# Patient Record
Sex: MALE | URBAN - NONMETROPOLITAN AREA
[De-identification: names, ages, dates, MRNs, and addresses within clinical notes are randomized per-mention and may not be internally consistent; named-entity substitution may affect disease eponyms.]

---

## 2020-10-29 ENCOUNTER — APPOINTMENT (RX ONLY)
Dept: URBAN - NONMETROPOLITAN AREA CLINIC 4 | Facility: CLINIC | Age: 50
Setting detail: DERMATOLOGY
End: 2020-10-29

## 2020-10-29 DIAGNOSIS — L82.0 INFLAMED SEBORRHEIC KERATOSIS: ICD-10-CM

## 2020-10-29 PROBLEM — D48.5 NEOPLASM OF UNCERTAIN BEHAVIOR OF SKIN: Status: ACTIVE | Noted: 2020-10-29

## 2020-10-29 PROCEDURE — 17110 DESTRUCTION B9 LES UP TO 14: CPT | Mod: 59

## 2020-10-29 PROCEDURE — ? LIQUID NITROGEN

## 2020-10-29 PROCEDURE — ? COUNSELING

## 2020-10-29 PROCEDURE — ? SHAVE REMOVAL

## 2020-10-29 PROCEDURE — 11301 SHAVE SKIN LESION 0.6-1.0 CM: CPT

## 2020-10-29 ASSESSMENT — LOCATION ZONE DERM: LOCATION ZONE: FACE

## 2020-10-29 ASSESSMENT — LOCATION SIMPLE DESCRIPTION DERM: LOCATION SIMPLE: LEFT CHEEK

## 2020-10-29 ASSESSMENT — LOCATION DETAILED DESCRIPTION DERM: LOCATION DETAILED: LEFT SUPERIOR MEDIAL MALAR CHEEK

## 2020-10-29 NOTE — PROCEDURE: LIQUID NITROGEN
Include Z78.9 (Other Specified Conditions Influencing Health Status) As An Associated Diagnosis?: No
Post-Care Instructions: I reviewed with the patient in detail post-care instructions. Patient is to wear sunprotection, and avoid picking at any of the treated lesions. Pt may apply Vaseline to crusted or scabbing areas.
Number Of Freeze-Thaw Cycles: 1 freeze-thaw cycle
Medical Necessity Clause: This procedure was medically necessary because the lesions that were treated were:
Render Post-Care Instructions In Note?: yes
Medical Necessity Information: It is in your best interest to select a reason for this procedure from the list below. All of these items fulfill various CMS LCD requirements except the new and changing color options.
Consent: The patient's consent was obtained including but not limited to risks of crusting, scabbing, blistering, scarring, darker or lighter pigmentary change, recurrence, incomplete removal and infection.
Detail Level: Zone

## 2024-07-01 ENCOUNTER — OFFICE VISIT (OUTPATIENT)
Dept: URGENT CARE | Facility: CLINIC | Age: 54
End: 2024-07-01
Payer: COMMERCIAL

## 2024-07-01 ENCOUNTER — APPOINTMENT (OUTPATIENT)
Dept: RADIOLOGY | Facility: CLINIC | Age: 54
End: 2024-07-01
Payer: COMMERCIAL

## 2024-07-01 VITALS
DIASTOLIC BLOOD PRESSURE: 78 MMHG | TEMPERATURE: 97.6 F | RESPIRATION RATE: 18 BRPM | SYSTOLIC BLOOD PRESSURE: 124 MMHG | HEART RATE: 92 BPM | WEIGHT: 293 LBS | OXYGEN SATURATION: 95 %

## 2024-07-01 DIAGNOSIS — S20.211A CONTUSION OF RIB ON RIGHT SIDE, INITIAL ENCOUNTER: Primary | ICD-10-CM

## 2024-07-01 DIAGNOSIS — S20.211A CONTUSION OF RIB ON RIGHT SIDE, INITIAL ENCOUNTER: ICD-10-CM

## 2024-07-01 PROCEDURE — 71101 X-RAY EXAM UNILAT RIBS/CHEST: CPT

## 2024-07-01 PROCEDURE — 99213 OFFICE O/P EST LOW 20 MIN: CPT | Performed by: PHYSICIAN ASSISTANT

## 2024-07-01 PROCEDURE — G0382 LEV 3 HOSP TYPE B ED VISIT: HCPCS | Performed by: PHYSICIAN ASSISTANT

## 2024-07-01 PROCEDURE — S9083 URGENT CARE CENTER GLOBAL: HCPCS | Performed by: PHYSICIAN ASSISTANT

## 2024-07-01 RX ORDER — METFORMIN HYDROCHLORIDE 500 MG/1
1 TABLET, EXTENDED RELEASE ORAL
COMMUNITY
Start: 2024-05-15

## 2024-07-01 RX ORDER — OMEPRAZOLE 40 MG/1
40 CAPSULE, DELAYED RELEASE ORAL DAILY
COMMUNITY

## 2024-07-01 RX ORDER — PREDNISONE 10 MG/1
20 TABLET ORAL DAILY
Qty: 20 TABLET | Refills: 0 | Status: SHIPPED | OUTPATIENT
Start: 2024-07-01 | End: 2024-07-11

## 2024-07-01 RX ORDER — PAROXETINE HYDROCHLORIDE 40 MG/1
1 TABLET, FILM COATED ORAL DAILY
COMMUNITY
Start: 2024-05-02

## 2024-07-01 RX ORDER — SEMAGLUTIDE 1.34 MG/ML
1 INJECTION, SOLUTION SUBCUTANEOUS
COMMUNITY
Start: 2024-05-14

## 2024-07-01 NOTE — LETTER
July 1, 2024     Patient: Jeffrey Morrissey   YOB: 1970   Date of Visit: 7/1/2024       To Whom it May Concern:    Jeffrey Morrissey was seen in my clinic on 7/1/2024. He may return to work on 07/2/2024 .    If you have any questions or concerns, please don't hesitate to call.         Sincerely,          Bienvenido Yu PA-C        CC: No Recipients
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
## 2025-05-16 ENCOUNTER — HOSPITAL ENCOUNTER (EMERGENCY)
Facility: HOSPITAL | Age: 55
Discharge: HOME | End: 2025-05-16
Attending: EMERGENCY MEDICINE
Payer: COMMERCIAL

## 2025-05-16 ENCOUNTER — APPOINTMENT (EMERGENCY)
Dept: CT IMAGING | Facility: HOSPITAL | Age: 55
End: 2025-05-16
Payer: COMMERCIAL

## 2025-05-16 VITALS
DIASTOLIC BLOOD PRESSURE: 70 MMHG | OXYGEN SATURATION: 98 % | HEART RATE: 66 BPM | RESPIRATION RATE: 12 BRPM | WEIGHT: 300 LBS | TEMPERATURE: 97.4 F | SYSTOLIC BLOOD PRESSURE: 111 MMHG | HEIGHT: 73 IN | BODY MASS INDEX: 39.76 KG/M2

## 2025-05-16 DIAGNOSIS — R51.9 HEADACHE: Primary | ICD-10-CM

## 2025-05-16 DIAGNOSIS — R53.83 FATIGUE: ICD-10-CM

## 2025-05-16 LAB
ALBUMIN SERPL BCG-MCNC: 4.4 G/DL (ref 3.5–5)
ALP SERPL-CCNC: 52 U/L (ref 34–104)
ALT SERPL W P-5'-P-CCNC: 27 U/L (ref 7–52)
ANION GAP SERPL CALCULATED.3IONS-SCNC: 7 MMOL/L (ref 4–13)
AST SERPL W P-5'-P-CCNC: 18 U/L (ref 13–39)
BASOPHILS # BLD AUTO: 0.08 THOUSANDS/ÂΜL (ref 0–0.1)
BASOPHILS NFR BLD AUTO: 1 % (ref 0–1)
BILIRUB SERPL-MCNC: 0.44 MG/DL (ref 0.2–1)
BUN SERPL-MCNC: 12 MG/DL (ref 5–25)
CALCIUM SERPL-MCNC: 9.6 MG/DL (ref 8.4–10.2)
CHLORIDE SERPL-SCNC: 105 MMOL/L (ref 96–108)
CO2 SERPL-SCNC: 27 MMOL/L (ref 21–32)
CREAT SERPL-MCNC: 0.8 MG/DL (ref 0.6–1.3)
EOSINOPHIL # BLD AUTO: 0.22 THOUSAND/ÂΜL (ref 0–0.61)
EOSINOPHIL NFR BLD AUTO: 3 % (ref 0–6)
ERYTHROCYTE [DISTWIDTH] IN BLOOD BY AUTOMATED COUNT: 12.3 % (ref 11.6–15.1)
FLUAV AG UPPER RESP QL IA.RAPID: NEGATIVE
FLUBV AG UPPER RESP QL IA.RAPID: NEGATIVE
GFR SERPL CREATININE-BSD FRML MDRD: 101 ML/MIN/1.73SQ M
GLUCOSE SERPL-MCNC: 85 MG/DL (ref 65–140)
HCT VFR BLD AUTO: 46.5 % (ref 36.5–49.3)
HGB BLD-MCNC: 15.5 G/DL (ref 12–17)
IMM GRANULOCYTES # BLD AUTO: 0.04 THOUSAND/UL (ref 0–0.2)
IMM GRANULOCYTES NFR BLD AUTO: 1 % (ref 0–2)
LYMPHOCYTES # BLD AUTO: 1.78 THOUSANDS/ÂΜL (ref 0.6–4.47)
LYMPHOCYTES NFR BLD AUTO: 21 % (ref 14–44)
MCH RBC QN AUTO: 30.2 PG (ref 26.8–34.3)
MCHC RBC AUTO-ENTMCNC: 33.3 G/DL (ref 31.4–37.4)
MCV RBC AUTO: 91 FL (ref 82–98)
MONOCYTES # BLD AUTO: 0.77 THOUSAND/ÂΜL (ref 0.17–1.22)
MONOCYTES NFR BLD AUTO: 9 % (ref 4–12)
NEUTROPHILS # BLD AUTO: 5.62 THOUSANDS/ÂΜL (ref 1.85–7.62)
NEUTS SEG NFR BLD AUTO: 65 % (ref 43–75)
NRBC BLD AUTO-RTO: 0 /100 WBCS
PLATELET # BLD AUTO: 234 THOUSANDS/UL (ref 149–390)
PMV BLD AUTO: 10.2 FL (ref 8.9–12.7)
POTASSIUM SERPL-SCNC: 4 MMOL/L (ref 3.5–5.3)
PROT SERPL-MCNC: 7.6 G/DL (ref 6.4–8.4)
RBC # BLD AUTO: 5.13 MILLION/UL (ref 3.88–5.62)
SARS-COV+SARS-COV-2 AG RESP QL IA.RAPID: NEGATIVE
SODIUM SERPL-SCNC: 139 MMOL/L (ref 135–147)
TSH SERPL DL<=0.05 MIU/L-ACNC: 1.39 UIU/ML (ref 0.45–4.5)
WBC # BLD AUTO: 8.51 THOUSAND/UL (ref 4.31–10.16)

## 2025-05-16 PROCEDURE — 36415 COLL VENOUS BLD VENIPUNCTURE: CPT | Performed by: PHYSICIAN ASSISTANT

## 2025-05-16 PROCEDURE — 96375 TX/PRO/DX INJ NEW DRUG ADDON: CPT

## 2025-05-16 PROCEDURE — 85025 COMPLETE CBC W/AUTO DIFF WBC: CPT | Performed by: PHYSICIAN ASSISTANT

## 2025-05-16 PROCEDURE — 96361 HYDRATE IV INFUSION ADD-ON: CPT

## 2025-05-16 PROCEDURE — 70450 CT HEAD/BRAIN W/O DYE: CPT

## 2025-05-16 PROCEDURE — 99283 EMERGENCY DEPT VISIT LOW MDM: CPT

## 2025-05-16 PROCEDURE — 80053 COMPREHEN METABOLIC PANEL: CPT | Performed by: PHYSICIAN ASSISTANT

## 2025-05-16 PROCEDURE — 96374 THER/PROPH/DIAG INJ IV PUSH: CPT

## 2025-05-16 PROCEDURE — 87811 SARS-COV-2 COVID19 W/OPTIC: CPT | Performed by: PHYSICIAN ASSISTANT

## 2025-05-16 PROCEDURE — 84443 ASSAY THYROID STIM HORMONE: CPT | Performed by: PHYSICIAN ASSISTANT

## 2025-05-16 PROCEDURE — 87804 INFLUENZA ASSAY W/OPTIC: CPT | Performed by: PHYSICIAN ASSISTANT

## 2025-05-16 RX ORDER — METOCLOPRAMIDE HYDROCHLORIDE 5 MG/ML
10 INJECTION INTRAMUSCULAR; INTRAVENOUS ONCE
Status: COMPLETED | OUTPATIENT
Start: 2025-05-16 | End: 2025-05-16

## 2025-05-16 RX ORDER — DIPHENHYDRAMINE HYDROCHLORIDE 50 MG/ML
25 INJECTION, SOLUTION INTRAMUSCULAR; INTRAVENOUS ONCE
Status: COMPLETED | OUTPATIENT
Start: 2025-05-16 | End: 2025-05-16

## 2025-05-16 RX ORDER — KETOROLAC TROMETHAMINE 30 MG/ML
15 INJECTION, SOLUTION INTRAMUSCULAR; INTRAVENOUS ONCE
Status: COMPLETED | OUTPATIENT
Start: 2025-05-16 | End: 2025-05-16

## 2025-05-16 RX ADMIN — METOCLOPRAMIDE 10 MG: 5 INJECTION, SOLUTION INTRAMUSCULAR; INTRAVENOUS at 11:52

## 2025-05-16 RX ADMIN — KETOROLAC TROMETHAMINE 15 MG: 30 INJECTION, SOLUTION INTRAMUSCULAR; INTRAVENOUS at 11:52

## 2025-05-16 RX ADMIN — SODIUM CHLORIDE 1000 ML: 0.9 INJECTION, SOLUTION INTRAVENOUS at 11:51

## 2025-05-16 RX ADMIN — DIPHENHYDRAMINE HYDROCHLORIDE 25 MG: 50 INJECTION, SOLUTION INTRAMUSCULAR; INTRAVENOUS at 11:52

## 2025-05-16 NOTE — Clinical Note
Jeffrey Morrissey was seen and treated in our emergency department on 5/16/2025.                Diagnosis:     Jeffrey  may return to work on return date.    He may return on this date: 05/19/2025         If you have any questions or concerns, please don't hesitate to call.      Genesis Edmondson PA-C    ______________________________           _______________          _______________  Hospital Representative                              Date                                Time

## 2025-05-16 NOTE — ED PROVIDER NOTES
Time reflects when diagnosis was documented in both MDM as applicable and the Disposition within this note       Time User Action Codes Description Comment    5/16/2025  1:23 PM Genesis Edmondson Add [R51.9] Headache     5/16/2025  1:23 PM Genesis Edmondson Add [R53.83] Fatigue           ED Disposition       ED Disposition   Discharge    Condition   Stable    Date/Time   Fri May 16, 2025  1:23 PM    Comment   Jeffrey Sheana discharge to home/self care.                   Assessment & Plan       Medical Decision Making  Patient is a 55-year-old male with a PMHx of DM 2, HLD, HTN, nonalcoholic fatty liver disease, ELBERT and thyroid nodules, presenting to the ED for evaluation of a headache x3 days.     DDx including but not limited to: tension headache, cluster headache, migraine, viral illness, tumor, temporal arteritis, zoster, sinusitis; doubt cervical artery dissection, ICH, SAH or meningitis.      Patient has a normal neurological exam.  No high-risk variables including neck pain/stiffness, witnessed LOC, thunderclap headache quality, nor is there limited neck flexion on examination. Low suspicion for SAH based on symptoms/exam. No fevers, confusion or nuchal rigidity, doubt meningitis/encephalitis.  Patient denies any history of migraines and has had a persistent headache for the past 3 days; will obtain a CT head to rule out tumor.  CT head unremarkable.  Patient's labs are within normal limits.  He had complete resolution of his headache after a migraine cocktail.  I advised patient to stay well hydrated and take tylenol/motrin as needed for symptoms. He was advised to schedule an appointment with his PCP for close follow-up or return to the ED if he experiences any new/worsening symptoms.    The management plan was discussed in detail with the patient at bedside and all questions were answered. Strict ED return instructions were discussed at bedside. Prior to discharge, both verbal and written instructions  were provided. We discussed the signs and symptoms that should prompt the patient to return to the ED. All questions were answered and the patient was comfortable with the plan of care and discharged home. The patient agrees to return to the Emergency Department for concerns and/or progression of illness.     Amount and/or Complexity of Data Reviewed  Labs: ordered.  Radiology: ordered.    Risk  Prescription drug management.        ED Course as of 05/16/25 1728   Fri May 16, 2025   1322 Patient reports complete resolution of headache with migraine cocktail.  He is requesting to go home at this time.       Medications   sodium chloride 0.9 % bolus 1,000 mL (0 mL Intravenous Stopped 5/16/25 1324)   ketorolac (TORADOL) injection 15 mg (15 mg Intravenous Given 5/16/25 1152)   metoclopramide (REGLAN) injection 10 mg (10 mg Intravenous Given 5/16/25 1152)   diphenhydrAMINE (BENADRYL) injection 25 mg (25 mg Intravenous Given 5/16/25 1152)       ED Risk Strat Scores                    No data recorded        SBIRT 22yo+      Flowsheet Row Most Recent Value   Initial Alcohol Screen: US AUDIT-C     1. How often do you have a drink containing alcohol? 1 Filed at: 05/16/2025 1117   2. How many drinks containing alcohol do you have on a typical day you are drinking?  0 Filed at: 05/16/2025 1117   3a. Male UNDER 65: How often do you have five or more drinks on one occasion? 0 Filed at: 05/16/2025 1117   Audit-C Score 1 Filed at: 05/16/2025 1117   CHARITO: How many times in the past year have you...    Used an illegal drug or used a prescription medication for non-medical reasons? Never Filed at: 05/16/2025 1117                            History of Present Illness       Chief Complaint   Patient presents with    Headache     Pt to ER for reports of L sided headache since Wednesday associated with fatigue. No unilateral weakness, CMS in tact in all extremities.        Past Medical History:   Diagnosis Date    Hypertension      "Nonalcoholic fatty liver       Past Surgical History:   Procedure Laterality Date    GASTRIC RESTRICTION SURGERY        History reviewed. No pertinent family history.   Social History[1]   E-Cigarette/Vaping    E-Cigarette Use Never User       E-Cigarette/Vaping Substances      I have reviewed and agree with the history as documented.     Patient is a 55-year-old male with a PMHx of DM 2, HLD, HTN, nonalcoholic fatty liver disease, ELBERT and thyroid nodules, presenting to the ED for evaluation of a headache x3 days.  Patient states that he developed a headache Wednesday afternoon while at work.  He states the headache started behind both of his eyes and radiated all throughout his head.  He describes the headache as dull in nature and states that it is not severe. He denies any \"thunderclap\" onset headache or loss of consciousness. He states that the headache has been waxing and waning in intensity and currently rates it at a 3/10. He states that the headache is primarily localized to the left temporal region and also states that he has pain/tension in his neck.  He states that he has also been fatigued over the past few days and slept most of the day yesterday.  He has been taking Aleve and Advil with temporary improvement.  He denies any fevers, chills, photophobia, neck stiffness or rash. He denies any recent head/neck trauma or neck manipulation. He denies any associated vision changes, diplopia, vision loss, facial asymmetry, dysarthria, unilateral numbness/weakness or ataxia. He denies any nausea, vomiting, abdominal pain, chest pain or SOB. Patient notes that he was recently treated with a 10-day course of Augmentin for strep throat which he completed yesterday and states the sore throat has resolved.          Review of Systems   Constitutional:  Positive for fatigue. Negative for chills and fever.   HENT:  Negative for congestion, ear pain and sore throat.    Respiratory:  Negative for cough and shortness of " breath.    Cardiovascular:  Negative for chest pain, palpitations and leg swelling.   Gastrointestinal:  Negative for abdominal pain, constipation, diarrhea, nausea and vomiting.   Genitourinary:  Negative for dysuria, flank pain and hematuria.   Musculoskeletal:  Positive for neck pain. Negative for back pain.   Skin:  Negative for rash.   Neurological:  Positive for headaches. Negative for dizziness, seizures and syncope.   All other systems reviewed and are negative.          Objective       ED Triage Vitals [05/16/25 1115]   Temperature Pulse Blood Pressure Respirations SpO2 Patient Position - Orthostatic VS   98 °F (36.7 °C) 81 134/81 20 98 % Sitting      Temp Source Heart Rate Source BP Location FiO2 (%) Pain Score    Temporal Monitor Left arm -- 3      Vitals      Date and Time Temp Pulse SpO2 Resp BP Pain Score FACES Pain Rating User   05/16/25 1330 97.4 °F (36.3 °C) 66 98 % 12 111/70 -- --    05/16/25 1204 -- 74 95 % 16 130/78 3 --    05/16/25 1115 98 °F (36.7 °C) 81 98 % 20 134/81 3 -- AM            Physical Exam  Vitals and nursing note reviewed.   Constitutional:       General: He is awake. He is not in acute distress.     Appearance: Normal appearance. He is well-developed. He is not ill-appearing or diaphoretic.      Comments: Patient is sitting up in the stretcher with the lights on.  He is in no acute distress.   HENT:      Head: Normocephalic and atraumatic.      Right Ear: External ear normal.      Left Ear: External ear normal.      Nose: Nose normal.      Mouth/Throat:      Lips: Pink.      Mouth: Mucous membranes are moist.     Eyes:      General: Lids are normal. No scleral icterus.     Conjunctiva/sclera: Conjunctivae normal.      Pupils: Pupils are equal, round, and reactive to light.      Comments: Pupils equal and reactive to light bilaterally.  Normal extraocular movements.  Vision grossly intact.  No nystagmus.   Neck:      Comments: Patient is able to move neck freely in all  directions without pain or difficulty.  Patient reports some tenderness over the cervical paraspinal muscles, but states the pain is slightly alleviated when palpating these muscles. No midline tenderness, step-offs or deformities.  No nuchal rigidity.    Cardiovascular:      Rate and Rhythm: Normal rate and regular rhythm.      Pulses: Normal pulses.           Radial pulses are 2+ on the right side and 2+ on the left side.      Heart sounds: Normal heart sounds, S1 normal and S2 normal.   Pulmonary:      Effort: Pulmonary effort is normal. No accessory muscle usage.      Breath sounds: Normal breath sounds. No stridor. No decreased breath sounds, wheezing, rhonchi or rales.   Abdominal:      General: Abdomen is flat. Bowel sounds are normal. There is no distension.      Palpations: Abdomen is soft.      Tenderness: There is no abdominal tenderness. There is no right CVA tenderness, left CVA tenderness, guarding or rebound.     Musculoskeletal:      Cervical back: Normal range of motion and neck supple. No signs of trauma. No pain with movement. Normal range of motion.      Right lower leg: No edema.      Left lower leg: No edema.     Skin:     General: Skin is warm and dry.      Capillary Refill: Capillary refill takes less than 2 seconds.      Coloration: Skin is not cyanotic, jaundiced or pale.     Neurological:      Mental Status: He is alert and oriented to person, place, and time.      GCS: GCS eye subscore is 4. GCS verbal subscore is 5. GCS motor subscore is 6.      Cranial Nerves: No dysarthria or facial asymmetry.      Gait: Gait normal.      Comments: Patient is awake and alert and oriented x 3.  No dysarthria or facial asymmetry or visual field deficits.  Sensation equal and intact bilaterally.  Strength 5/5 in bilateral upper and lower extremities.  Negative pronator drift.  Normal coordination including finger-nose and heel-to-dixon.  Negative Romberg.  Normal steady gait without ataxia.  Normal tandem  walk.   Psychiatric:         Attention and Perception: Attention normal.         Mood and Affect: Mood normal.         Speech: Speech normal.         Behavior: Behavior normal. Behavior is cooperative.         Results Reviewed       Procedure Component Value Units Date/Time    TSH, 3rd generation with Free T4 reflex [[id number]]  (Normal) Collected: 05/16/25 1150    Lab Status: Final result Specimen: Blood from Arm, Left Updated: 05/16/25 1230     TSH 3RD GENERATON 1.389 uIU/mL     FLU/COVID Rapid Antigen (30 min. TAT) - Preferred screening test in ED [[id number]]  (Normal) Collected: 05/16/25 1150    Lab Status: Final result Specimen: Nares from Nose Updated: 05/16/25 1215     SARS COV Rapid Antigen Negative     Influenza A Rapid Antigen Negative     Influenza B Rapid Antigen Negative    Narrative:      This test has been performed using the Quidel Tashia 2 FLU+SARS Antigen test under the Emergency Use Authorization (EUA). This test has been validated by the  and verified by the performing laboratory. The Tashia uses lateral flow immunofluorescent sandwich assay to detect SARS-COV, Influenza A and Influenza B Antigen.     The Quidel Tashia 2 SARS Antigen test does not differentiate between SARS-CoV and SARS-CoV-2.     Negative results are presumptive and may be confirmed with a molecular assay, if necessary, for patient management. Negative results do not rule out SARS-CoV-2 or influenza infection and should not be used as the sole basis for treatment or patient management decisions. A negative test result may occur if the level of antigen in a sample is below the limit of detection of this test.     Positive results are indicative of the presence of viral antigens, but do not rule out bacterial infection or co-infection with other viruses.     All test results should be used as an adjunct to clinical observations and other information available to the provider.    FOR PEDIATRIC PATIENTS - copy/paste COVID  Guidelines URL to browser: [email or web address]    Comprehensive metabolic panel [[id number]] Collected: 05/16/25 1150    Lab Status: Final result Specimen: Blood from Arm, Left Updated: 05/16/25 1214     Sodium 139 mmol/L      Potassium 4.0 mmol/L      Chloride 105 mmol/L      CO2 27 mmol/L      ANION GAP 7 mmol/L      BUN 12 mg/dL      Creatinine 0.80 mg/dL      Glucose 85 mg/dL      Calcium 9.6 mg/dL      AST 18 U/L      ALT 27 U/L      Alkaline Phosphatase 52 U/L      Total Protein 7.6 g/dL      Albumin 4.4 g/dL      Total Bilirubin 0.44 mg/dL      eGFR 101 ml/min/1.73sq m     Narrative:      National Kidney Disease Foundation guidelines for Chronic Kidney Disease (CKD):     Stage 1 with normal or high GFR (GFR > 90 mL/min/1.73 square meters)    Stage 2 Mild CKD (GFR = 60-89 mL/min/1.73 square meters)    Stage 3A Moderate CKD (GFR = 45-59 mL/min/1.73 square meters)    Stage 3B Moderate CKD (GFR = 30-44 mL/min/1.73 square meters)    Stage 4 Severe CKD (GFR = 15-29 mL/min/1.73 square meters)    Stage 5 End Stage CKD (GFR <15 mL/min/1.73 square meters)  Note: GFR calculation is accurate only with a steady state creatinine    CBC and differential [[id number]] Collected: 05/16/25 1150    Lab Status: Final result Specimen: Blood from Arm, Left Updated: 05/16/25 1200     WBC 8.51 Thousand/uL      RBC 5.13 Million/uL      Hemoglobin 15.5 g/dL      Hematocrit 46.5 %      MCV 91 fL      MCH 30.2 pg      MCHC 33.3 g/dL      RDW 12.3 %      MPV 10.2 fL      Platelets 234 Thousands/uL      nRBC 0 /100 WBCs      Segmented % 65 %      Immature Grans % 1 %      Lymphocytes % 21 %      Monocytes % 9 %      Eosinophils Relative 3 %      Basophils Relative 1 %      Absolute Neutrophils 5.62 Thousands/µL      Absolute Immature Grans 0.04 Thousand/uL      Absolute Lymphocytes 1.78 Thousands/µL      Absolute Monocytes 0.77 Thousand/µL      Eosinophils Absolute 0.22 Thousand/µL       Basophils Absolute 0.08 Thousands/µL             CT head without contrast   Final Interpretation by Ric Boone MD (05/16 1421)      No acute intracranial abnormality.                  Workstation performed: [id number]             Procedures    ED Medication and Procedure Management   Prior to Admission Medications   Prescriptions Last Dose Informant Patient Reported? Taking?   Cholecalciferol 50 MCG (2000 UT) CAPS   Yes No   Sig: Take 1 capsule by mouth daily   Ozempic, 1 MG/DOSE, 4 MG/3ML injection pen   Yes No   Sig: Inject 1 mg under the skin every 7 days   PARoxetine (PAXIL) 40 MG tablet   Yes No   Sig: Take 1 tablet by mouth daily   metFORMIN (GLUCOPHAGE-XR) 500 mg 24 hr tablet   Yes No   Sig: Take 1 tablet by mouth daily with breakfast   omeprazole (PriLOSEC) 40 MG capsule   Yes No   Sig: Take 40 mg by mouth daily      Facility-Administered Medications: None     Discharge Medication List as of 5/16/2025  1:24 PM        CONTINUE these medications which have NOT CHANGED    Details   Cholecalciferol 50 MCG (2000 UT) CAPS Take 1 capsule by mouth daily, Starting Wed 5/15/2024, Historical Med      metFORMIN (GLUCOPHAGE-XR) 500 mg 24 hr tablet Take 1 tablet by mouth daily with breakfast, Starting Wed 5/15/2024, Historical Med      omeprazole (PriLOSEC) 40 MG capsule Take 40 mg by mouth daily, Historical Med      Ozempic, 1 MG/DOSE, 4 MG/3ML injection pen Inject 1 mg under the skin every 7 days, Starting Tue 5/14/2024, Historical Med      PARoxetine (PAXIL) 40 MG tablet Take 1 tablet by mouth daily, Starting Thu 5/2/2024, Historical Med           No discharge procedures on file.  ED SEPSIS DOCUMENTATION   Time reflects when diagnosis was documented in both MDM as applicable and the Disposition within this note       Time User Action Codes Description Comment    5/16/2025  1:23 PM Genesis Edmondson Add [R51.9] Headache     5/16/2025  1:23 PM Genesis Edmondson Add [R53.83] Fatigue                      [1]    Social History  Tobacco Use    Smoking status: Never    Smokeless tobacco: Never   Vaping Use    Vaping status: Never Used   Substance Use Topics    Alcohol use: Never    Drug use: Never        Genesis Edmondson PA-C  05/16/25 1726